# Patient Record
(demographics unavailable — no encounter records)

---

## 2024-10-09 NOTE — HISTORY OF PRESENT ILLNESS
[FreeTextEntry1] : NPA/CPE [de-identified] : Patient is a 26-year-old female with no PMH presenting for NPA/CPE. 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 Patient reports she has always had gastro issues. She'll feel okay, then randomly will have to run to the bathroom no matter what she eats. This happens a few times a week, but not with every bowel movement. She has had 2 episodes of food getting stuck in middle of her esophagus. It winds up passing, but she regurgitates water. She feels like she's always needed water to push food down. The first time it happened, her cousin did the Heimlich and she spit up some water and felt like she couldn't breathe. However, no food came out during the Heimlich. Otherwise, feeling well today. Mood is good. She exercises regularly at the gym.  Last pap- 04/24 (negative)

## 2024-10-09 NOTE — HEALTH RISK ASSESSMENT
[Good] : ~his/her~  mood as  good [Yes] : Yes [2 - 4 times a month (2 pts)] : 2-4 times a month (2 points) [1 or 2 (0 pts)] : 1 or 2 (0 points) [Never (0 pts)] : Never (0 points) [No] : In the past 12 months have you used drugs other than those required for medical reasons? No [0] : 2) Feeling down, depressed, or hopeless: Not at all (0) [PHQ-2 Negative - No further assessment needed] : PHQ-2 Negative - No further assessment needed [With Family] : lives with family [Employed] : employed [Significant Other] : lives with significant other [Sexually Active] : sexually active [Feels Safe at Home] : Feels safe at home [Fully functional (bathing, dressing, toileting, transferring, walking, feeding)] : Fully functional (bathing, dressing, toileting, transferring, walking, feeding) [Fully functional (using the telephone, shopping, preparing meals, housekeeping, doing laundry, using] : Fully functional and needs no help or supervision to perform IADLs (using the telephone, shopping, preparing meals, housekeeping, doing laundry, using transportation, managing medications and managing finances) [No falls in past year] : Patient reported no falls in the past year [Reports normal functional visual acuity (ie: able to read med bottle)] : Reports normal functional visual acuity [Smoke Detector] : smoke detector [Carbon Monoxide Detector] : carbon monoxide detector [Safety elements used in home] : safety elements used in home [Seat Belt] :  uses seat belt [Sunscreen] : uses sunscreen [Never] : Never [NO] : No [HIV test declined] : HIV test declined [Hepatitis C test declined] : Hepatitis C test declined [Audit-CScore] : 2 [de-identified] : goes to the gym 5 times per week [de-identified] : eats organic foods, eats whatever she wants [[id number]] : 0 [High Risk Behavior] : no high risk behavior [Reports changes in hearing] : Reports no changes in hearing [Reports changes in vision] : Reports no changes in vision [Reports changes in dental health] : Reports no changes in dental health [Travel to Developing Areas] : does not  travel to developing areas [Caregiver Concerns] : does not have caregiver concerns [MammogramDate] : N/A [PapSmearDate] : 04/24 [BoneDensityDate] : N/A [ColonoscopyDate] : N/A [FreeTextEntry2] : mental health counselor at [employer]

## 2024-10-09 NOTE — PLAN
[FreeTextEntry1] : Esophageal dysmotility/dysphagia: - Patient reports sensation of food getting stuck mid-esophagus - Will trial PPI daily - Xray cinesophagram ordered today. - If stricture or no improvement with PPI, will refer to GI  Diarrhea: - Recommend food diary to assess for association with certain foods - Will check TTG IgA - Recommend metamucil and probiotic daily - If persistent despite these interventions, will check stool studies  HCM: - Labs/urine collected and sent at today's visit. - STI testing including hepatitis and HIV offered and declined - Next pap- 04/25 vs 04/27 - Advised patient to make appt for eye exam - Patient declines flu vaccine today

## 2024-10-09 NOTE — REVIEW OF SYSTEMS
[Diarrhea] : diarrhea [Negative] : Heme/Lymph [Abdominal Pain] : no abdominal pain [Nausea] : no nausea [Constipation] : no constipation [Vomiting] : no vomiting [Heartburn] : no heartburn [Melena] : no melena [FreeTextEntry7] : See HPI

## 2025-04-28 NOTE — HISTORY OF PRESENT ILLNESS
[FreeTextEntry1] : 28 yo p0 here for annual exam on ocps, no co.  meds- just ocps.  nkda  fam hx- mgm had hyst for precancerous cond. mggm  ovarian ca,pa-br ca late 50s, doing well.  denies btb, dc, pel pain works as mental health counsellor. works for [employer], all ages. cbt, psychodynamic. dbt.

## 2025-06-03 NOTE — HISTORY OF PRESENT ILLNESS
[de-identified] : 6/3/205 The patient is a 27 year old female who presents today complaining of lower back pain 1 day of severe lower back pain. Injured at gym doing weight medball slam. Felt pop. Tried Tylenol.  Had history of pedicle fracture in high school.  Gets intermittent back soreness after leg day that would typically go away after a day or two. Pain now is sever and not relenting. when lifting left leg feels it radiate into buttocks. NO bowel or bladder symptoms. no fevers or chills.    PMH: None ALL: TUTU  Works as mental health counselor   Date of Injury/Onset: 06/02/25 Pain:  At Rest: 3/10 With Activity:  6/10 Mechanism of injury: Patient was at the gym, went to  a weighted ball and felt a po in her back  Quality of symptoms: Stiffness, sharp pain  Improves with: Tylenol, Ice  Worse with: Sitting to standing position, reaching forward  Prior treatment: Denies any treatment for this injury  Prior Imaging: None  Additional Information: Hx of FX in her L5 in high school

## 2025-06-03 NOTE — IMAGING
[de-identified] : Spine: Inspection/Palpation: No tenderness to palpation throughout Cervical/thoracic/lumbar spine. No bony stepoffs, No lesions.  Gait: Non-antalgic, able to perform bilateral toe and heel rise.  Able to perform tandem gait.    Range of Motion: Lumbar Spine: Flexion to 80 degrees, Extension to 30 degrees,  pain worse with flexion   Neurologic:  Bilateral Lower Extremities 5/5 Iliopsoas/Quadriceps/Hamstrings/ Tibialis Anterior/ Gastrocnemius. Extensor Hallucis Longus/ Flexor Hallucis Longus except    Sensation intact to light touch L2-S1     positive straight leg raise on left   No pain with IR/ER/Flexion of HIps bilaterally   X-ray Ap/Lateral of lumbar spine were viewed and interpreted.  Normal alignment is maintained without any spondylolisthesis.   L4-5 dis height loss. L5-s1 isthmic spondylolisthesis.

## 2025-06-03 NOTE — ASSESSMENT
[FreeTextEntry1] : 27 F with low back pain and ishtmic spondylolisthesis intemritent LLE radic MDP  We will also provide a prescription for anti-inflammatories.  Discussed major side effects of medication including but not limited to gastritis and acute kidney injury.  She was instructed to take with food and to discontinue use if stomach or esophageal pain developed. meloxicam to follow mDP PT FU 6 weeks if pain persists MRI L spine